# Patient Record
Sex: FEMALE | Race: WHITE | NOT HISPANIC OR LATINO | Employment: OTHER | URBAN - METROPOLITAN AREA
[De-identification: names, ages, dates, MRNs, and addresses within clinical notes are randomized per-mention and may not be internally consistent; named-entity substitution may affect disease eponyms.]

---

## 2021-05-10 RX ORDER — VITAMIN B COMPLEX
1 TABLET ORAL DAILY
COMMUNITY

## 2021-05-10 RX ORDER — NAPROXEN SODIUM 220 MG
220 TABLET ORAL 2 TIMES DAILY PRN
COMMUNITY

## 2021-05-10 RX ORDER — ESTRADIOL 0.04 MG/D
0.5 FILM, EXTENDED RELEASE TRANSDERMAL 2 TIMES WEEKLY
COMMUNITY

## 2021-05-10 RX ORDER — UBIDECARENONE 75 MG
1 CAPSULE ORAL DAILY
COMMUNITY

## 2021-05-11 ENCOUNTER — OFFICE VISIT (OUTPATIENT)
Dept: OBGYN CLINIC | Facility: CLINIC | Age: 71
End: 2021-05-11
Payer: COMMERCIAL

## 2021-05-11 VITALS
HEIGHT: 64 IN | WEIGHT: 167 LBS | DIASTOLIC BLOOD PRESSURE: 80 MMHG | BODY MASS INDEX: 28.51 KG/M2 | SYSTOLIC BLOOD PRESSURE: 158 MMHG

## 2021-05-11 DIAGNOSIS — N95.1 MENOPAUSAL SYMPTOMS: ICD-10-CM

## 2021-05-11 DIAGNOSIS — N39.3 SUI (STRESS URINARY INCONTINENCE, FEMALE): Primary | ICD-10-CM

## 2021-05-11 PROCEDURE — 99213 OFFICE O/P EST LOW 20 MIN: CPT | Performed by: OBSTETRICS & GYNECOLOGY

## 2021-05-11 RX ORDER — ACETAMINOPHEN 500 MG
1000 TABLET ORAL
COMMUNITY

## 2021-06-22 PROBLEM — N95.1 MENOPAUSAL SYMPTOMS: Status: ACTIVE | Noted: 2021-05-11

## 2021-06-22 PROBLEM — N39.3 SUI (STRESS URINARY INCONTINENCE, FEMALE): Status: ACTIVE | Noted: 2021-05-12

## 2021-06-22 NOTE — PROGRESS NOTES
Assessment/Plan:    ALINA (stress urinary incontinence, female)  S/P TVT with good results  She is not having any sig leaking, pain or other surgical complications  Overall, satisfied with results  Menopausal symptoms  She was started on estradiol patch with good results and plans to continue  Diagnoses and all orders for this visit:    ALINA (stress urinary incontinence, female)    Menopausal symptoms    Other orders  -     estradiol (VIVELLE-DOT) 0 0375 MG/24HR; Place 0 5 patches on the skin 2 (two) times a week   -     cyanocobalamin (VITAMIN B-12) 100 mcg tablet; Take 1 tablet by mouth daily  -     naproxen sodium (ALEVE) 220 MG tablet; Take 220 mg by mouth 2 (two) times a day as needed  -     Coenzyme Q10 (CoQ10) 100 MG CAPS; Take 1 capsule by mouth daily  -     Cholecalciferol (Vitamin D3) 125 MCG (5000 UT) TBDP; Take 1 tablet by mouth daily  -     diphenhydrAMINE-APAP, sleep, (TYLENOL PM EXTRA STRENGTH PO); Take 1,000 mg by mouth daily  -     acetaminophen (TYLENOL) 500 mg tablet; Take 1,000 mg by mouth daily at bedtime          Subjective:      Patient ID: Elena Arana is a 66 y o  female  HPI    The following portions of the patient's history were reviewed and updated as appropriate: past family history, past medical history and past social history  Review of Systems      Objective:      /80 (BP Location: Left arm, Patient Position: Sitting, Cuff Size: Standard)   Ht 5' 4" (1 626 m)   Wt 75 8 kg (167 lb)   BMI 28 67 kg/m²          Physical Exam  Vitals reviewed  Constitutional:       Appearance: Normal appearance  Genitourinary:     General: Normal vulva  Exam position: Lithotomy position  Labia:         Right: No lesion or injury  Left: No lesion or injury  Urethra: No prolapse  Vagina: Normal       Uterus: Absent  Comments: Well supported anterior vaginal wall and bladder neck  Neurological:      Mental Status: She is alert

## 2021-06-22 NOTE — ASSESSMENT & PLAN NOTE
S/P TVT with good results  She is not having any sig leaking, pain or other surgical complications  Overall, satisfied with results

## 2021-09-08 ENCOUNTER — VBI (OUTPATIENT)
Dept: ADMINISTRATIVE | Facility: OTHER | Age: 71
End: 2021-09-08

## 2021-09-08 NOTE — TELEPHONE ENCOUNTER
Upon review of the In Basket request we were able to locate, review, and update the patient chart as requested for Mammogram     Any additional questions or concerns should be emailed to the Practice Liaisons via [email or web address] email, please do not reply via In Basket      Thank you  Chaparro Moreland